# Patient Record
Sex: FEMALE | Race: WHITE
[De-identification: names, ages, dates, MRNs, and addresses within clinical notes are randomized per-mention and may not be internally consistent; named-entity substitution may affect disease eponyms.]

---

## 2021-05-02 ENCOUNTER — HOSPITAL ENCOUNTER (EMERGENCY)
Dept: HOSPITAL 41 - JD.ED | Age: 36
Discharge: HOME | End: 2021-05-02
Payer: COMMERCIAL

## 2021-05-02 DIAGNOSIS — E66.9: ICD-10-CM

## 2021-05-02 DIAGNOSIS — Z88.8: ICD-10-CM

## 2021-05-02 DIAGNOSIS — R10.11: Primary | ICD-10-CM

## 2021-05-02 DIAGNOSIS — Z79.899: ICD-10-CM

## 2021-05-02 PROCEDURE — 36415 COLL VENOUS BLD VENIPUNCTURE: CPT

## 2021-05-02 PROCEDURE — 83690 ASSAY OF LIPASE: CPT

## 2021-05-02 PROCEDURE — 81001 URINALYSIS AUTO W/SCOPE: CPT

## 2021-05-02 PROCEDURE — 96375 TX/PRO/DX INJ NEW DRUG ADDON: CPT

## 2021-05-02 PROCEDURE — 80053 COMPREHEN METABOLIC PANEL: CPT

## 2021-05-02 PROCEDURE — 81025 URINE PREGNANCY TEST: CPT

## 2021-05-02 PROCEDURE — 85025 COMPLETE CBC W/AUTO DIFF WBC: CPT

## 2021-05-02 PROCEDURE — 96365 THER/PROPH/DIAG IV INF INIT: CPT

## 2021-05-02 PROCEDURE — 99284 EMERGENCY DEPT VISIT MOD MDM: CPT

## 2021-05-02 NOTE — EDM.PDOC
ED HPI GENERAL MEDICAL PROBLEM





- General


Chief Complaint: Abdominal Pain


Stated Complaint: RT SIDE ABDOMINAL PAIN


Time Seen by Provider: 21 05:20


Source of Information: Reports: Patient


History Limitations: Reports: No Limitations





- History of Present Illness


INITIAL COMMENTS - FREE TEXT/NARRATIVE: 





Patient arrived to ED via private vehicle





Onset of symptoms was midnight


Describes pain localized to right upper quadrant of abdomen


Pain has been constant and continuous since onset, severity rated 7/10


Associated nausea with 2 episodes of vomiting 


Endorses anorexia, diarrhea and low-grade fever since Wednesday, 2021


Has not taken anything for pain tonight





Reports known history of gallstones, diagnosed about 5 years ago


Has had intermittent occurrence of "gallbladder episodes"


These manifest as pain in right\flank which radiates around to her abdomen


Associated nausea with occasional vomiting


Episodes of been occurring with increasing frequency recently


She is scheduled for consultation with surgeon 2021 to discuss 

cholecystectomy








  ** Right Flank


Pain Score (Numeric/FACES): 7





- Related Data


                                    Allergies











Allergy/AdvReac Type Severity Reaction Status Date / Time


 


metformin Allergy  Hives Verified 21 04:46











Home Meds: 


                                    Home Meds





Folic Acid 1 mg PO DAILY 21 [History]


L.acidoph,Paracasei, B.lactis [Probiotic] 1 each PO DAILY 21 [History]


Omeprazole 20 mg PO DAILY 21 [History]


Prochlorperazine Maleate 10 mg PO Q6H PRN #12 tablet 21 [Rx]











Past Medical History


Gastrointestinal History: Reports: Cholelithiasis


Genitourinary History: Reports: Renal Calculus


OB/GYN History: Reports: Pregnancy


Musculoskeletal History: Reports: Fracture


Endocrine/Metabolic History: Reports: Hypothyroidism, Obesity/BMI 30+





- Past Surgical History


Female  Surgical History: Reports:  Section


Musculoskeletal Surgical History: Reports: ORIF





Social & Family History





- Tobacco Use


Tobacco Use Status *Q: Never Tobacco User





- Recreational Drug Use


Recreational Drug Use: No





ED ROS GENERAL





- Review of Systems


Review Of Systems: See Below


Free Text/Narrative/Comment: 





Constitutional - fever; anorexia


Eyes - no eye pain; no visual disturbance


ENT - no rhinorrhea; no congestion; no epistaxis


Cardiovascular - no chest pain


Respiratory - no shortness of breath; no cough


Gastrointestinal - abdominal pain; nausea; vomiting; diarrhea


Genitourinary - no dysuria; LNMP 3 weeks ago


Musculoskeletal - no neck pain; no back pain; no extremity injury


Neurological - no headache; no speech disturbance; no weakness








ED EXAM, GI/ABD





- Physical Exam


Exam: See Below


Text/Narrative:: 





Constitutional - awake; alert; mild pain distress


Head - no facial swelling or weakness


Eyes - conjunctiva normal


ENT - no nasal deformity; no epistaxis; normal phonation


Neck - no swelling


Respiratory - normal respiratory effort; no crackles or wheezing; no stridor


Cardiovascular - regular rhythm; normal rate; S1; S2; grade 1/6 systolic murmur


GI/Abdomen - normal bowel sounds; soft; mild tenderness epigastric and left 

upper quadrant; mild to moderate tenderness right upper quadrant; no 

rebound/guarding; no mass


Musculoskeletal - grossly normal strength and motion; no swelling or deformity


Skin - warm; dry


Neurologic - normal speech; no weakness; gait intact


Psychiatric - normal mood and affect; memory and attention normal








Course





- Vital Signs


Text/Narrative:: 





.


Considered etiologies included:


Abdominal pain, nausea, vomiting, biliary colic, cholecystitis, pancreatitis, 

gastritis, appendicitis, flank pain





Symptoms and examination were discussed


Investigations were initiated


Empiric treatment was provided with ketorolac and prochlorperazine 


At reevaluation pain severity had improved to 3/10, and nausea resolved


Consideration for ED imaging vs deferral with clinical observation was discussed


Patient indicated and she just wanted to go home and go to bed


She tolerated trial of oral fluid and food intake without recurrence of nausea 

or gastrointestinal distress


A limited quantity of prochlorperazine was prescribed for symptomatic treatment


Patient was felt to be stable for outpatient follow-up


Return precautions were provided








Last Recorded V/S: 


                                Last Vital Signs











Temp  36.1 C   21 04:46


 


Pulse  70   21 07:53


 


Resp  16   21 04:46


 


BP  144/80 H  21 07:53


 


Pulse Ox  100   21 07:53














- Orders/Labs/Meds


Orders: 


                               Active Orders 24 hr











 Category Date Time Status


 


 Peripheral IV Insertion Adult [OM.PC] Stat Oth  21 05:30 Ordered











Labs: 


                                Laboratory Tests











  21 Range/Units





  05:34 05:34 07:29 


 


WBC  6.96    (3.98-10.04)  K/mm3


 


RBC  4.88    (3.98-5.22)  M/mm3


 


Hgb  12.7    (11.2-15.7)  gm/dl


 


Hct  39.6    (34.1-44.9)  %


 


MCV  81.1    (79.4-94.8)  fl


 


MCH  26.0    (25.6-32.2)  pg


 


MCHC  32.1 L    (32.2-35.5)  g/dl


 


RDW Std Deviation  43.5    (36.4-46.3)  fL


 


Plt Count  349    (182-369)  K/mm3


 


MPV  10.2    (9.4-12.3)  fl


 


Neut % (Auto)  60.6    (34.0-71.1)  %


 


Lymph % (Auto)  21.0    (19.3-51.7)  %


 


Mono % (Auto)  12.8 H    (4.7-12.5)  %


 


Eos % (Auto)  1.3    (0.7-5.8)  


 


Baso % (Auto)  0.4    (0.1-1.2)  %


 


Neut # (Auto)  4.22    (1.56-6.13)  K/mm3


 


Lymph # (Auto)  1.46    (1.18-3.74)  K/mm3


 


Mono # (Auto)  0.89 H    (0.24-0.36)  K/mm3


 


Eos # (Auto)  0.09    (0.04-0.36)  K/mm3


 


Baso # (Auto)  0.03    (0.01-0.08)  K/mm3


 


Manual Slide Review  Normal smear    


 


Sodium   138   (136-145)  mEq/L


 


Potassium   3.8   (3.5-5.1)  mEq/L


 


Chloride   105   ()  mEq/L


 


Carbon Dioxide   25   (21-32)  mEq/L


 


Anion Gap   11.8   (5-15)  


 


BUN   10   (7-18)  mg/dL


 


Creatinine   0.8   (0.55-1.02)  mg/dL


 


Est Cr Clr Drug Dosing   84.76   mL/min


 


Estimated GFR (MDRD)   > 60   (>60)  mL/min


 


BUN/Creatinine Ratio   12.5 L   (14-18)  


 


Glucose   122 H   ()  mg/dL


 


Calcium   8.3 L   (8.5-10.1)  mg/dL


 


Total Bilirubin   0.2   (0.2-1.0)  mg/dL


 


AST   161 H   (15-37)  U/L


 


ALT   288 H   (14-59)  U/L


 


Alkaline Phosphatase   242 H   ()  U/L


 


Total Protein   7.3   (6.4-8.2)  g/dl


 


Albumin   3.0 L   (3.4-5.0)  g/dl


 


Globulin   4.3   gm/dL


 


Albumin/Globulin Ratio   0.7 L   (1-2)  


 


Lipase   122   ()  U/L


 


Urine Color    Yellow  (Yellow)  


 


Urine Appearance    Clear  (Clear)  


 


Urine pH    6.5  (5.0-8.0)  


 


Ur Specific Gravity    1.025  (1.005-1.030)  


 


Urine Protein    Trace H  (Negative)  


 


Urine Glucose (UA)    Negative  (Negative)  


 


Urine Ketones    Negative  (Negative)  


 


Urine Occult Blood    Negative  (Negative)  


 


Urine Nitrite    Negative  (Negative)  


 


Urine Bilirubin    Negative  (Negative)  


 


Urine Urobilinogen    0.2  (0.2-1.0)  


 


Ur Leukocyte Esterase    Negative  (Negative)  


 


Urine RBC    0-5  (0-5)  /hpf


 


Urine WBC    0-5  (0-5)  /hpf


 


Ur Epithelial Cells    0-5  (0-5)  /hpf


 


Urine Bacteria    Few  (FEW)  /hpf


 


Urine Mucus    Moderate H  (FEW)  /hpf


 


Urine HCG, Qual     (NEGATIVE)  














  21 Range/Units





  07:29 


 


WBC   (3.98-10.04)  K/mm3


 


RBC   (3.98-5.22)  M/mm3


 


Hgb   (11.2-15.7)  gm/dl


 


Hct   (34.1-44.9)  %


 


MCV   (79.4-94.8)  fl


 


MCH   (25.6-32.2)  pg


 


MCHC   (32.2-35.5)  g/dl


 


RDW Std Deviation   (36.4-46.3)  fL


 


Plt Count   (182-369)  K/mm3


 


MPV   (9.4-12.3)  fl


 


Neut % (Auto)   (34.0-71.1)  %


 


Lymph % (Auto)   (19.3-51.7)  %


 


Mono % (Auto)   (4.7-12.5)  %


 


Eos % (Auto)   (0.7-5.8)  


 


Baso % (Auto)   (0.1-1.2)  %


 


Neut # (Auto)   (1.56-6.13)  K/mm3


 


Lymph # (Auto)   (1.18-3.74)  K/mm3


 


Mono # (Auto)   (0.24-0.36)  K/mm3


 


Eos # (Auto)   (0.04-0.36)  K/mm3


 


Baso # (Auto)   (0.01-0.08)  K/mm3


 


Manual Slide Review   


 


Sodium   (136-145)  mEq/L


 


Potassium   (3.5-5.1)  mEq/L


 


Chloride   ()  mEq/L


 


Carbon Dioxide   (21-32)  mEq/L


 


Anion Gap   (5-15)  


 


BUN   (7-18)  mg/dL


 


Creatinine   (0.55-1.02)  mg/dL


 


Est Cr Clr Drug Dosing   mL/min


 


Estimated GFR (MDRD)   (>60)  mL/min


 


BUN/Creatinine Ratio   (14-18)  


 


Glucose   ()  mg/dL


 


Calcium   (8.5-10.1)  mg/dL


 


Total Bilirubin   (0.2-1.0)  mg/dL


 


AST   (15-37)  U/L


 


ALT   (14-59)  U/L


 


Alkaline Phosphatase   ()  U/L


 


Total Protein   (6.4-8.2)  g/dl


 


Albumin   (3.4-5.0)  g/dl


 


Globulin   gm/dL


 


Albumin/Globulin Ratio   (1-2)  


 


Lipase   ()  U/L


 


Urine Color   (Yellow)  


 


Urine Appearance   (Clear)  


 


Urine pH   (5.0-8.0)  


 


Ur Specific Gravity   (1.005-1.030)  


 


Urine Protein   (Negative)  


 


Urine Glucose (UA)   (Negative)  


 


Urine Ketones   (Negative)  


 


Urine Occult Blood   (Negative)  


 


Urine Nitrite   (Negative)  


 


Urine Bilirubin   (Negative)  


 


Urine Urobilinogen   (0.2-1.0)  


 


Ur Leukocyte Esterase   (Negative)  


 


Urine RBC   (0-5)  /hpf


 


Urine WBC   (0-5)  /hpf


 


Ur Epithelial Cells   (0-5)  /hpf


 


Urine Bacteria   (FEW)  /hpf


 


Urine Mucus   (FEW)  /hpf


 


Urine HCG, Qual  Negative  (NEGATIVE)  











Meds: 


Medications














Discontinued Medications














Generic Name Dose Route Start Last Admin





  Trade Name Freq  PRN Reason Stop Dose Admin


 


Prochlorperazine Edisylate 10  52 mls @ 150 mls/hr  21 05:31  21 

05:49





  mg/ Sodium Chloride  IV  21 05:51  150 mls/hr





  ONETIME ONE   Administration


 


Ketorolac Tromethamine  15 mg  21 05:31  21 05:45





  Ketorolac 15 Mg/Ml Sdv  IVPUSH  21 05:32  15 mg





  ONETIME ONE   Administration


 


Sodium Chloride  10 ml  21 05:31  21 05:49





  Sodium Chloride 0.9% 10 Ml Syringe  FLUSH   10 ml





  ASDIRECTED PRN   Administration





  Keep Vein Open  














Departure





- Departure


Time of Disposition: 07:39


Disposition: Home, Self-Care 01


Condition: Good


Clinical Impression: 


 Abdominal pain, RUQ (right upper quadrant), History of cholelithiasis








- Discharge Information


Prescriptions: 


Prochlorperazine Maleate 10 mg PO Q6H PRN #12 tablet


 PRN Reason: Nausea or GI distress


Instructions:  Biliary Colic, Adult


Referrals: 


Anabelle Singletary NP [Primary Care Provider] - 


Forms:  ED Department Discharge


Additional Instructions: 


Return if condition worsens


May resume general activity and light diet as tolerated


Continue usual medications


May take IBUPROFEN 600 mg every 6 hours as needed for pain


May use PROCHLORPERAZINE as prescribed, as needed for pain or gastrointestinal 

distress


Follow-up with primary care provider is recommended


Follow-up with surgeon on Thursday, 2021 as scheduled











Sepsis Event Note (ED)





- Evaluation


Sepsis Screening Result: No Definite Risk





- My Orders


Last 24 Hours: 


My Active Orders





21 05:30


Peripheral IV Insertion Adult [OM.PC] Stat 














- Assessment/Plan


Last 24 Hours: 


My Active Orders





21 05:30


Peripheral IV Insertion Adult [OM.PC] Stat